# Patient Record
Sex: MALE
[De-identification: names, ages, dates, MRNs, and addresses within clinical notes are randomized per-mention and may not be internally consistent; named-entity substitution may affect disease eponyms.]

---

## 2020-12-09 ENCOUNTER — HOSPITAL ENCOUNTER (EMERGENCY)
Dept: HOSPITAL 53 - M ED | Age: 25
Discharge: HOME | End: 2020-12-09
Payer: COMMERCIAL

## 2020-12-09 VITALS — SYSTOLIC BLOOD PRESSURE: 133 MMHG | DIASTOLIC BLOOD PRESSURE: 68 MMHG

## 2020-12-09 VITALS — WEIGHT: 177.36 LBS | BODY MASS INDEX: 28.5 KG/M2 | HEIGHT: 66 IN
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
## 2020-12-09 DIAGNOSIS — M54.5: Primary | ICD-10-CM

## 2020-12-09 LAB
APPEARANCE UR: CLEAR
BACTERIA UR QL AUTO: NEGATIVE
BILIRUB UR QL STRIP.AUTO: NEGATIVE
GLUCOSE UR QL STRIP.AUTO: NEGATIVE MG/DL
HGB UR QL STRIP.AUTO: NEGATIVE
KETONES UR QL STRIP.AUTO: (no result) MG/DL
LEUKOCYTE ESTERASE UR QL STRIP.AUTO: NEGATIVE
MUCOUS THREADS URNS QL MICRO: (no result)
NITRITE UR QL STRIP.AUTO: NEGATIVE
PH UR STRIP.AUTO: 6 UNITS (ref 5–9)
PROT UR QL STRIP.AUTO: NEGATIVE MG/DL
RBC # UR AUTO: 1 /HPF (ref 0–3)
SP GR UR STRIP.AUTO: 1.03 (ref 1–1.03)
SQUAMOUS #/AREA URNS AUTO: 0 /HPF (ref 0–6)
UROBILINOGEN UR QL STRIP.AUTO: 0.2 MG/DL (ref 0–2)
WBC #/AREA URNS AUTO: 0 /HPF (ref 0–3)

## 2020-12-09 NOTE — REP
INDICATION:

trauma, posterior lower ribs.



COMPARISON:

None.



TECHNIQUE:

Five views of the lumbar spine are obtained.



FINDINGS:

Five views of the lumbar spine show preserved vertebral body heights and intact

alignment.  Disc spaces are maintained.  There is no evidence of spondylolysis or

spondylolisthesis.  No fracture or collapse is seen.  No transverse process fracture

is seen.  No sacral fracture is noted.  Visualized bowel gas pattern is normal.



IMPRESSION:

Unremarkable lumbar spine series.  No fracture seen.





<Electronically signed by Collin Oneal > 12/09/20 [id number]

## 2020-12-09 NOTE — REP
INDICATION:

trauma, posterior lower ribs.



COMPARISON:

None.



TECHNIQUE:

Five views including PA chest.



FINDINGS:

PA chest radiograph is normal.  There is no evidence of pneumothorax or hydrothorax.

Mediastinum is not widened.  Heart size is normal.  The lung fields are clear.



Multiple views of the left ribcage show no evidence of rib fracture or bony

destructive lesion.



IMPRESSION:

Unremarkable left rib radiographic series.  No rib fracture seen.  Lung fields are

clear.





<Electronically signed by Collin Oneal > 12/09/20 [id number]